# Patient Record
Sex: MALE | Race: WHITE | NOT HISPANIC OR LATINO | Employment: OTHER | URBAN - METROPOLITAN AREA
[De-identification: names, ages, dates, MRNs, and addresses within clinical notes are randomized per-mention and may not be internally consistent; named-entity substitution may affect disease eponyms.]

---

## 2020-06-23 ENCOUNTER — HISTORICAL (OUTPATIENT)
Dept: ADMINISTRATIVE | Facility: HOSPITAL | Age: 75
End: 2020-06-23

## 2020-06-29 LAB
FINAL CULTURE: NORMAL
FINAL CULTURE: NORMAL

## 2021-01-15 ENCOUNTER — HISTORICAL (OUTPATIENT)
Dept: CARDIOLOGY | Facility: HOSPITAL | Age: 76
End: 2021-01-15

## 2021-01-15 LAB
INR PPP: 1.1 (ref 0–1.3)
PROTHROMBIN TIME: 14.3 SECOND(S) (ref 11.1–13.7)

## 2021-08-13 ENCOUNTER — HOSPITAL ENCOUNTER (OUTPATIENT)
Dept: EMERGENCY MEDICINE | Facility: HOSPITAL | Age: 76
End: 2021-08-14
Attending: INTERNAL MEDICINE | Admitting: INTERNAL MEDICINE

## 2021-08-13 LAB
ABS NEUT (OLG): 5.75 X10(3)/MCL (ref 2.1–9.2)
ALBUMIN SERPL-MCNC: 4 GM/DL (ref 3.4–4.8)
ALBUMIN/GLOB SERPL: 1.5 RATIO (ref 1.1–2)
ALP SERPL-CCNC: 56 UNIT/L (ref 40–150)
ALT SERPL-CCNC: 23 UNIT/L (ref 0–55)
AST SERPL-CCNC: 20 UNIT/L (ref 5–34)
BASOPHILS # BLD AUTO: 0 X10(3)/MCL (ref 0–0.2)
BASOPHILS NFR BLD AUTO: 0 %
BILIRUB SERPL-MCNC: 0.7 MG/DL
BILIRUBIN DIRECT+TOT PNL SERPL-MCNC: 0.3 MG/DL (ref 0–0.5)
BILIRUBIN DIRECT+TOT PNL SERPL-MCNC: 0.4 MG/DL (ref 0–0.8)
BNP BLD-MCNC: 77.6 PG/ML
BUN SERPL-MCNC: 22.6 MG/DL (ref 8.4–25.7)
CALCIUM SERPL-MCNC: 9.6 MG/DL (ref 8.8–10)
CHLORIDE SERPL-SCNC: 103 MMOL/L (ref 98–107)
CO2 SERPL-SCNC: 25 MMOL/L (ref 23–31)
CREAT SERPL-MCNC: 1.01 MG/DL (ref 0.73–1.18)
EOSINOPHIL # BLD AUTO: 0.1 X10(3)/MCL (ref 0–0.9)
EOSINOPHIL NFR BLD AUTO: 1 %
ERYTHROCYTE [DISTWIDTH] IN BLOOD BY AUTOMATED COUNT: 17.4 % (ref 11.5–17)
GLOBULIN SER-MCNC: 2.7 GM/DL (ref 2.4–3.5)
GLUCOSE SERPL-MCNC: 114 MG/DL (ref 82–115)
HCT VFR BLD AUTO: 36.8 % (ref 42–52)
HGB BLD-MCNC: 11.4 GM/DL (ref 14–18)
LYMPHOCYTES # BLD AUTO: 3 X10(3)/MCL (ref 0.6–4.6)
LYMPHOCYTES NFR BLD AUTO: 30 %
MCH RBC QN AUTO: 30.8 PG (ref 27–31)
MCHC RBC AUTO-ENTMCNC: 31 GM/DL (ref 33–36)
MCV RBC AUTO: 99.5 FL (ref 80–94)
MONOCYTES # BLD AUTO: 1 X10(3)/MCL (ref 0.1–1.3)
MONOCYTES NFR BLD AUTO: 10 %
NEUTROPHILS # BLD AUTO: 5.75 X10(3)/MCL (ref 2.1–9.2)
NEUTROPHILS NFR BLD AUTO: 58 %
PLATELET # BLD AUTO: 294 X10(3)/MCL (ref 130–400)
PMV BLD AUTO: 9.9 FL (ref 9.4–12.4)
POTASSIUM SERPL-SCNC: 4.7 MMOL/L (ref 3.5–5.1)
PROT SERPL-MCNC: 6.7 GM/DL (ref 5.8–7.6)
RBC # BLD AUTO: 3.7 X10(6)/MCL (ref 4.7–6.1)
SARS-COV-2 AG RESP QL IA.RAPID: NEGATIVE
SARS-COV-2 RNA RESP QL NAA+PROBE: NOT DETECTED
SODIUM SERPL-SCNC: 137 MMOL/L (ref 136–145)
TROPONIN I SERPL-MCNC: <0.01 NG/ML (ref 0–0.04)
WBC # SPEC AUTO: 10 X10(3)/MCL (ref 4.5–11.5)

## 2022-04-30 NOTE — ED PROVIDER NOTES
Patient:   Cristofer Kwon             MRN: [id number]            FIN: [id number]               Age:   76 years     Sex:  Male     :  1945   Associated Diagnoses:   Chest pain in adult; Dyspnea; Hypoxia; HTN (hypertension)   Author:   Gigi Leone MD      Basic Information   Time seen: Date & time 2021 10:44:00.   History source: Patient, nursing home records, [facility].   Arrival mode: Wheelchair.   History limitation: None.   Additional information: Chief Complaint from Nursing Triage Note : Chief Complaint   2021 10:42 CDT      Chief Complaint           presents from [facility] c/o SOB and chest pain since this AM. NH reports low O2 saturation.HX of AFIB  (Modified) .      History of Present Illness   The patient presents with weakness, [facility]Or[facility] [place] PA: Male sent from nursing [place] for chest pain, dyspnea and weakness for ER evaluation.  and     I, Dr. Leone, assumed care of this patient at 1212.     76 year old white male presents to the ED from [facility] for SOB, nonradiating substernal chest pressure, patient reports chest pain occurred while he was at rest.  Associated with nausea and some clamminess.   Pt says the chest pressure this morning lasted 30 minutes to 1 hour, and he denies fever, chills, cough, and recent ill contacts. Pt currently has no chest pain but says he is still SOB..  The onset was 4  hours ago.  The course/duration of symptoms is constant.  The character of symptoms is generalized.  The degree at present is moderate.  Risk factors consist of none.  Prior episodes: occasional.  Therapy today: 2L O2 nasal cannula.  Associated symptoms: chest pain, abdominal pain, nausea and shortness of breath.  Additional history: none.        Review of Systems   Constitutional symptoms:  No fever, no chills   Skin symptoms:  No rash, no pruritus, no abrasions, no breakdown, no lesion.    Eye symptoms:  No pain, no blurred vision.     ENMT symptoms:  No ear pain, no sore throat, no nasal congestion.    Respiratory symptoms:  Shortness of breath, no cough, no sputum production   Cardiovascular symptoms:  Chest pain, inferior, central, pressure, no palpitations, no diaphoresis   Gastrointestinal symptoms:  Nausea, no abdominal pain, no vomiting, no diarrhea, no constipation.    Genitourinary symptoms:  No dysuria, no hematuria, no discharge.    Musculoskeletal symptoms:  No Muscle pain, no Joint pain   Neurologic symptoms:  No headache, no dizziness, no altered level of consciousness, no numbness, no tingling, no weakness.              Additional review of systems information: All other systems reviewed and otherwise negative.      Health Status   Allergies:    Allergic Reactions (Selected)  No Known Allergies.   Medications:  (Selected)   Prescriptions  Prescribed  Carafate 1 g oral tablet: 1 gm = 1 tab(s), Oral, AC & HS, # 120 tab(s), 0 Refill(s)  Protonix 40 mg ORAL enteric coated tablet: 40 mg = 1 tab(s), Oral, BID, # 60 tab(s), 0 Refill(s)  Xarelto 20mg Tablet: 10 mg = 0.5 tab(s), Oral, Daily, # 15 tab(s), 0 Refill(s)  calcium carbonate 500 mg oral tablet, chewable: 500 mg = 1 tab(s), Oral, BID, # 60 tab(s), 0 Refill(s)  verapamil 240 mg oral tablet, extended release: 240 mg = 1 tab(s), Oral, Daily, # 90 tab(s), 6 Refill(s)  Documented Medications  Documented  Azo-Standard 95 mg oral tablet: 95 mg = 1 tab(s), Oral, TID  Myrbetriq 25 mg oral tablet, extended release: 25 mg = 1 tab(s), Oral, Daily  Systane ophthalmic solution: 1 drop(s), Eye-Both, QID, PRN PRN as needed for dry eyes, # 12 mL, 0 Refill(s)  Vitamin B Complex oral capsule: 1 cap(s), Oral, Daily  allopurinol 100 mg oral tablet: 100 mg = 1 tab(s), Oral, Daily, # 30 tab(s), 0 Refill(s)  calcium citrate 315 mg oral tablet: 1 tab(s), Oral, BID, 0 Refill(s)  gabapentin 600 mg oral tablet: 600 mg = 1 tab(s), Oral, TID, # 90 tab(s), 0 Refill(s)  isosorbide MONOnitrate 60 mg oral tablet,  Extended Release: 60 mg = 1 tab(s), Oral, Daily  lisinopril 10 mg oral tablet: 10 mg = 1 tab(s), Oral, Daily, # 30 tab(s), 0 Refill(s)  pantoprazole 20 mg ORAL EC-Tablet: 20 mg = 1 tab(s), Oral, Daily, # 30 tab(s), 0 Refill(s)  potassium chloride 20 mEq oral TABLET extended release: 20 mEq = 1 tab(s), Oral, Daily, # 30 tab(s), 0 Refill(s)  pravastatin 40 mg oral tablet: 40 mg = 1 tab(s), Oral, Daily  sotalol 80 mg oral tablet: 80 mg = 1 tab(s), Oral, BID  tamsulosin 0.4 mg oral capsule: 0.4 mg = 1 cap(s), Oral, Daily, # 30 cap(s), 0 Refill(s)  traZODONE 50 mg oral tablet ( Desyrel ): 50 mg = 1 tab(s), Oral, Once a day (at bedtime), # 30 tab(s), 0 Refill(s)  venlafaxine 75 mg oral capsule, extended release: 75 mg = 1 cap(s), Oral, Daily, # 30 cap(s), 0 Refill(s).      Past Medical/ Family/ Social History   Medical history:    Active  HTN - Hypertension ([id number])  Anemia ([id number])  PAF - Paroxysmal atrial fibrillation ([id number])  Urinary retention ([id number]).   Surgical history:    Esophagogastroduodenoscopy on 6/22/2021 at 76 Years.  Comments:  6/22/2021 14:02 CDT - Arelis ESPINAL, Dimas Deutsch  auto-populated from documented surgical case  Cystostomy Suprapubic (None) on 7/9/2020 at 75 Years.  Comments:  7/9/2020 19:14 ROBERT - Luis Felipe Graf RN  auto-populated from documented surgical case.   Family history:    No family history items have been selected or recorded..   Social history:    Social & Psychosocial Habits    Tobacco  09/08/2020  Use: Former smoker, quit more    Patient Wants Consult For Cessation Counseling No    10/06/2020  Use: Former smoker, quit more    Type: Oral    Patient Wants Consult For Cessation Counseling N/A    Smokeless tobacco use: Smokeless tobacco user wi    01/15/2021  Use: 10 or more cigarettes (1/    Type: Oral    Patient Wants Consult For Cessation Counseling No    Smokeless tobacco use: Smokeless tobacco user wi    03/15/2021  Use: Never (less than 100 in l    Patient Wants  Consult For Cessation Counseling No    06/15/2021  Use: Former smoker, quit more    Patient Wants Consult For Cessation Counseling N/A    06/16/2021  Use: Former smoker, quit more    Patient Wants Consult For Cessation Counseling No    Abuse/Neglect  09/08/2020  SHX Any signs of abuse or neglect No    10/06/2020  SHX Any signs of abuse or neglect No    Feels unsafe at home: No    Safe place to go: Yes    01/15/2021  SHX Any signs of abuse or neglect No    03/15/2021  SHX Any signs of abuse or neglect No    Feels unsafe at home: No    Safe place to go: Yes    06/15/2021  SHX Any signs of abuse or neglect No    06/16/2021  SHX Any signs of abuse or neglect No  , Alcohol use: Denies, Tobacco use: dipping, Drug use: Denies, Family/social situation: Nursing home resident ([facility]).      Physical Examination               Vital Signs   Vital Signs   8/13/2021 10:42 CDT      Temperature Oral          37.1 DegC                             Temperature Oral (calculated)             98.78 DegF                             Peripheral Pulse Rate     75 bpm                             Respiratory Rate          20 br/min                             SpO2                      93 %  LOW                             Oxygen Therapy            Nasal cannula                             Oxygen Flow Rate          2 L/min                             Systolic Blood Pressure   116 mmHg                             Diastolic Blood Pressure  63 mmHg  .      Vital Signs (last 24 hrs)_____  Last Charted___________  Temp Oral     37.1 DegC  (AUG 13 10:42)  Heart Rate Peripheral   65 bpm  (AUG 13 12:00)  Resp Rate         16 br/min  (AUG 13 12:00)  SBP      128 mmHg  (AUG 13 12:00)  DBP      78 mmHg  (AUG 13 12:00)  SpO2      94 %  (AUG 13 12:00)  .   Basic Oxygen Information   8/13/2021 12:00 CDT      SpO2                      94 %                             Oxygen Flow Rate          3 L/min                             Oxygen  Therapy            Nasal cannula    8/13/2021 10:42 CDT      SpO2                      93 %  LOW                             Oxygen Flow Rate          2 L/min                             Oxygen Therapy            Nasal cannula  .   General:  Alert, no acute distress, Pleasant 76 year old male.    Skin:  Warm, dry, pink.    Head:  Normocephalic, atraumatic.    Neck:  Supple, trachea midline.    Eye:  Pupils are equal, round and reactive to light, extraocular movements are intact, normal conjunctiva.    Ears, nose, mouth and throat:  Tympanic membranes clear, oral mucosa moist.    Cardiovascular:  Regular rate and rhythm, No murmur, No edema, Capillary refill: Bilateral, upper extremity, lower extremity, < 2 seconds.    Respiratory:  Lungs are clear to auscultation, respirations are non-labored, breath sounds are equal, Does not appear to be in respiratory distress, Patient is speaking in full sentences.    Chest wall:  No tenderness.   Back:  Nontender, Normal range of motion, Normal alignment.    Musculoskeletal:  Normal ROM, normal strength, no tenderness, no swelling, no deformity.    Gastrointestinal:  Soft, Nontender, Non distended, Normal bowel sounds.    Neurological:  Alert and oriented to person, place, time, and situation, No focal neurological deficit observed, CN II-XII intact, normal speech observed (speaking in complete sentences)   Psychiatric:  Cooperative, appropriate mood & affect      Medical Decision Making   Differential Diagnosis:  Weakness, dizziness, hypotension, hypoglycemia, pneumonia, Angina, cardiovascular disease.    Rationale:  Patient's initial labs including troponin are unremarkable.  EKG within normal limits.  Chest x-ray unremarkable.  His vitals are stable.  He does intermittently require 2 L nasal cannula to maintain oxygen saturation greater than 92%.  Off of oxygen he does desaturate to approximately 89%.  He does voice improvement of his shortness of breath while in the  emergency department.  Denies any further episodes of chest pain.  Patient will be admitted to physician covering for his PCP Dr Guillen, asked that CTA pulmonary be acquired and patient be given Lovenox.  [facility] is consult and will follow up on patient on floor for serial troponins, further evaluation..   Documents reviewed:  Emergency department nurses' notes.   Orders  Launch Orders   Laboratory:  BNP (Order): Stat collect, 8/13/2021 10:45 CDT, Blood, Lab Collect, Print Label By Order Location, 8/13/2021 10:45 CDT  COVID-19  IDnow (Order): Stat collect, Nasal, 8/13/2021 10:45 CDT, Nurse collect  Troponin-I (Order): Stat collect, 8/13/2021 10:45 CDT, Blood, Lab Collect, Print Label By Order Location, 8/13/2021 10:45 CDT  CMP (Order): Stat collect, 8/13/2021 10:45 CDT, Blood, Lab Collect, Print Label By Order Location, 8/13/2021 10:45 CDT  CBC w/ Auto Diff (Order): Now collect, 8/13/2021 10:45 CDT, Blood, Lab Collect, Print Label By Order Location, 8/13/2021 10:45 CDT  Radiology:  XR Chest 2 Views (Order): Stat, 8/13/2021 10:45 CDT, Dyspnea, None, Stretcher, Rad Type, Not Scheduled  Cardiology:  EKG Adult 12 Lead (Order): 8/13/2021 10:45 CDT, Stat, Once, 8/13/2021 10:45 CDT, -1, -1, 8/13/2021 10:45 CDT.   Electrocardiogram:  Time 8/13/2021 10:42:00, rate 72, normal sinus rhythm, No ST-T changes, no ectopy, normal GA & QRS intervals, EP Interp, QT interval 477.    Results review:  Lab results : Lab View   8/13/2021 10:58 CDT      Sodium Lvl                137 mmol/L                             Potassium Lvl             4.7 mmol/L                             Chloride                  103 mmol/L                             CO2                       25 mmol/L                             Calcium Lvl               9.6 mg/dL                             Glucose Lvl               114 mg/dL                             BUN                       22.6 mg/dL                             Creatinine                1.01 mg/dL                              eGFR-AA                   >60  NA                             eGFR-RUBINA                  >60 mL/min/1.73 m2  NA                             Bili Total                0.7 mg/dL                             Bili Direct               0.3 mg/dL                             Bili Indirect             0.40 mg/dL                             AST                       20 unit/L                             ALT                       23 unit/L                             Alk Phos                  56 unit/L                             Total Protein             6.7 gm/dL                             Albumin Lvl               4.0 gm/dL                             Globulin                  2.7 gm/dL                             A/G Ratio                 1.5 ratio                             BNP                       77.6 pg/mL                             Troponin-I                <0.010 ng/mL                             WBC                       10.0 x10(3)/mcL                             RBC                       3.70 x10(6)/mcL  LOW                             Hgb                       11.4 gm/dL  LOW                             Hct                       36.8 %  LOW                             Platelet                  294 x10(3)/mcL                             MCV                       99.5 fL  HI                             MCH                       30.8 pg                             MCHC                      31.0 gm/dL  LOW                             RDW                       17.4 %  HI                             MPV                       9.9 fL                             Abs Neut                  5.75 x10(3)/mcL                             Neutro Auto               58 %  NA                             Lymph Auto                30 %  NA                             Mono Auto                 10 %  NA                             Eos Auto                  1 %  NA                             Abs Eos                    0.1 x10(3)/mcL                             Basophil Auto             0 %  NA                             Abs Neutro                5.75 x10(3)/mcL                             Abs Lymph                 3.0 x10(3)/mcL                             Abs Mono                  1.0 x10(3)/mcL                             Abs Baso                  0.0 x10(3)/mcL    8/13/2021 10:46 CDT      COVID-19 Rapid            NEGATIVE  .   Radiology results:  Rad Results (ST)  < 12 hrs   Accession: [id number]  Order: XR Chest 2 Views  Report Dt/Tm: 08/13/2021 11:41  Report:      History:  Dyspnea     Reference:  30 July 2021     Findings:  PA and lateral views of the chest were obtained. Heart is not  enlarged. Bilateral opacities are improved compared to the prior film  with some persistent opacities at the left lung base. No pneumothorax.     Impression:   Bilateral opacities improved with some persistent left basilar  opacities.               .      Reexamination/ Reevaluation   Time: 8/13/2021 13:30:00 .   Course: improving.   Assessment: exam unchanged.   Notes: Patient reports his shortness of breath has improved but has not resolved.  Denies chest pain at this time states that he would like to go home.  He states a nursing home he does occasionally require oxygen for low pulse ox but not chronically.  He is amenable to staying for a delta troponin..      Impression and Plan   Diagnosis   Chest pain in adult (XUJ70-HH R07.9)   Dyspnea (GDI91-WP R06.00)   Hypoxia (YPB52-ZH R09.02)   HTN (hypertension) (HKP55-SJ I10)      Calls-Consults   -  8/13/2021 15:14:00 , [facility], recommends No Recommendations, will see patient after admission.    Plan   Condition: Improved.    Disposition: Admit time  8/13/2021 15:12:00, Place in Observation Unit, Cortes Guillen MD.    Counseled: Patient, Regarding diagnosis, Regarding diagnostic results, Regarding treatment plan, Patient indicated understanding of instructions.    Notes: Vanessa RAY  acted solely as a scribe for and in the presence of Dr. Leone who performed the service. .       Addendum   I, Gigi Leone MD, personally performed the services described in this documentation as described in my presence. I performed the history, physical exam, and had face-to-face time with the patient.  This note is accurate and complete.

## 2022-04-30 NOTE — H&P
Patient:   Cristofer Kwon             MRN: [id number]            FIN: [id number]               Age:   76 years     Sex:  Male     :  1945   Associated Diagnoses:   Weakness or fatigue; HTN (hypertension); Dyspnea; Chest pain in adult; Hypoxia   Author:   Cortes Guillen MD      Basic Information   Admit information:  Fluctuating blood pressure. .    Source of history:  Medical record.    Present at bedside:  Medical personnel.    History limitation:  None.       Chief Complaint   2021 10:42 CDT      presents from [facility] c/o SOB and chest pain since this AM. NH reports low O2 saturation.HX of AFIB  (Modified)       History of Present Illness   76 year old white male presents to the ED from [facility] for SOB, nonradiating substernal chest pressure, patient reports chest pain occurred while he was at rest.  Associated with nausea and some clamminess.   Pt says the chest pressure this morning lasted 30 minutes to 1 hour, and he denies fever, chills, cough, and recent ill contacts. Pt currently has no chest pain but says he is still SOB..  The onset was 4  hours ago.  The course/duration of symptoms is constant.  The character of symptoms is generalized.  The degree at present is moderate.  Risk factors consist of none.  Prior episodes: occasional.  Therapy today: 2L O2 nasal cannula.  Associated symptoms: chest pain, abdominal pain, nausea and shortness of breath.  Additional history: none.         Review of Systems   Constitutional:  Negative except as documented in history of present illness, No fever, No chills, No sweats.    Eye:  No icterus, No discharge, No double vision.    Ear/Nose/Mouth/Throat:  No decreased hearing, No ear pain.    Respiratory:  Negative, No sputum production, No hemoptysis.    Cardiovascular:  Negative.    Gastrointestinal:  Negative.    Genitourinary:  Negative.    Hematology/Lymphatics:  Negative.    Endocrine:  Negative.    Immunologic:   Recorded as Task  Date: 01/10/2018 09:17 AM, Created By: Molly De Jesus  Task Name: 4. Patient Message  Assigned To: [facility] Neuro Nurse Team  Regarding Patient: MARE JEROME, Status: In Progress  Comment:   Molly De Jesus - 10 Cresencio 2018 9:17 AM    Patient Message to Provider  \"REASON FOR CALL: Patients dad is calling to speak with the office about what are some of the sideeffects of the Depakote medication? She also needs a note for school for her to have OT SERVICES at school.    CALLER'S RELATIONSHIP TO PATIENT: dad  IF OTHER, NAME AND RELATIONSHIP: Francisco    BEST NUMBER TO BE CONTACTED: [phone]-dad cell  ALTERNATIVE PHONE NUMBER: [phone]-wife edgar     Turnaround time given to caller:  \"\"THIS MESSAGE WILL BE SENT TO  ___ (state provider's first and last name) ____, THE CLINICAL TEAM WILL RETURN YOUR CALL AS SOON AS THEY HAVE REVIEWED YOUR MESSAGE\"\"    READ BACK MESSAGE TO PATIENT\"  Sherrell Bar - 11 Jan 2018 2:17 PM    UNDELEGATED TASK  Sherrell Bar - 11 Jan 2018 2:17 PM    TASK IN PROGRESS  Sherrell Bar - 11 Jan 2018 2:17 PM    TASK EDITED  do want ot for her.  KATERINA KEITH - 11 Jan 2018 3:35 PM    TASK EDITED  letter for OT written in EMR    common side effects of Depakote are weight gain, nausea, somnolence, dizziness, tremor, alopecia, rash, blurred vision. Rare side effects are hepatitis, pancreatitis, hyponatremia, pancytopenia, elevated ammonia, aplastic anemia, coma  Sabiha Lopez - 12 Jan 2018 3:04 PM    TASK EDITED  LM for patient's father to return call.  Ngoc Cerda - 12 Jan 2018 3:10 PM    TASK REASSIGNED: Previously Assigned To [facility]  Patient father is returning the nurse call please call him back [phone]  Sabiha Lopez - 12 Jan 2018 3:21 PM    TASK EDITED  Spoke with patient's father - father informed that letter was completed. Mailed to home address on file.     Per father's request he wanted the side effects mailed to home address as well.      Electronically signed by:Sabiha   John CONROY  Jan 12 2018  3:21PM CST     Negative.    Musculoskeletal:  Negative.    Integumentary:  Negative.    Neurologic:  Negative.    Psychiatric:  Negative.    All other systems are negative      Health Status   Allergies:    Allergic Reactions (Selected)  No Known Allergies,    Allergies (1) Active Reaction  No Known Allergies None Documented     Current medications:  (Selected)   Inpatient Medications  Ordered  Lovenox: 90 mg, form: Injection, Subcutaneous, BID, first dose 08/13/21 21:00:00 CDT  Protonix 40 mg ORAL enteric coated tablet: 40 mg, form: Tab-EC, Oral, BID, first dose 08/13/21 16:01:00 CDT, STAT  Zofran 2 mg/mL injectable solution: 4 mg, form: Injection, IV Push, q4hr PRN for nausea, first dose 08/14/21 8:23:00 CDT  isosorbide MONOnitrate 60 mg oral tablet, Extended Release: 60 mg, form: Tab-ER, Oral, Daily, first dose 08/13/21 16:00:00 CDT, STAT  lisinopril 10 mg oral tablet: 10 mg, form: Tab, Oral, Daily, first dose 08/13/21 16:00:00 CDT, STAT  simvastatin 20 mg oral tablet: 20 mg, form: Tab, Oral, Daily, first dose 08/13/21 16:01:00 CDT, STAT  sotalol 80 mg oral tablet: 80 mg, form: Tab, Oral, BID, first dose 08/13/21 16:01:00 CDT, STAT  verapamil extended release: 240 mg, form: SR Tab, Oral, Daily, first dose 08/13/21 16:01:00 CDT, STAT  Prescriptions  Prescribed  Carafate 1 g oral tablet: 1 gm = 1 tab(s), Oral, AC & HS, # 120 tab(s), 0 Refill(s)  Protonix 40 mg ORAL enteric coated tablet: 40 mg = 1 tab(s), Oral, BID, # 60 tab(s), 0 Refill(s)  Xarelto 20mg Tablet: 10 mg = 0.5 tab(s), Oral, Daily, # 15 tab(s), 0 Refill(s)  calcium carbonate 500 mg oral tablet, chewable: 500 mg = 1 tab(s), Oral, BID, # 60 tab(s), 0 Refill(s)  verapamil 240 mg oral tablet, extended release: 240 mg = 1 tab(s), Oral, Daily, # 90 tab(s), 6 Refill(s)  Documented Medications  Documented  Azo-Standard 95 mg oral tablet: 95 mg = 1 tab(s), Oral, TID  Myrbetriq 25 mg oral tablet, extended release: 25 mg = 1 tab(s), Oral, Daily  Systane Complete Optimal Dry Eye  Relief: 0 Refill(s)  Systane ophthalmic solution: 1 drop(s), Eye-Both, QID, PRN PRN as needed for dry eyes, # 12 mL, 0 Refill(s)  Template Non-Formulary Med: 30 mL, Oral, BID, Proteinex, 0 Refill(s)  Vitamin B Complex oral capsule: 1 cap(s), Oral, Daily  allopurinol 100 mg oral tablet: 100 mg = 1 tab(s), Oral, Daily, # 30 tab(s), 0 Refill(s)  calcium citrate 315 mg oral tablet: 1 tab(s), Oral, BID, 0 Refill(s)  gabapentin 600 mg oral tablet: 600 mg = 1 tab(s), Oral, TID, # 90 tab(s), 0 Refill(s)  isosorbide MONOnitrate 60 mg oral tablet, Extended Release: 60 mg = 1 tab(s), Oral, Daily  lisinopril 10 mg oral tablet: 10 mg = 1 tab(s), Oral, Daily, # 30 tab(s), 0 Refill(s)  pantoprazole 20 mg ORAL EC-Tablet: 20 mg = 1 tab(s), Oral, Daily, # 30 tab(s), 0 Refill(s)  potassium chloride 20 mEq oral TABLET extended release: 20 mEq = 1 tab(s), Oral, Daily, # 30 tab(s), 0 Refill(s)  pravastatin 40 mg oral tablet: 40 mg = 1 tab(s), Oral, Daily  sotalol 80 mg oral tablet: 80 mg = 1 tab(s), Oral, BID  tamsulosin 0.4 mg oral capsule: 0.4 mg = 1 cap(s), Oral, Daily, # 30 cap(s), 0 Refill(s)  traZODONE 50 mg oral tablet ( Desyrel ): 50 mg = 1 tab(s), Oral, Once a day (at bedtime), # 30 tab(s), 0 Refill(s)  venlafaxine 75 mg oral capsule, extended release: 75 mg = 1 cap(s), Oral, Daily, # 30 cap(s), 0 Refill(s),    Medications (8) Active  Scheduled: (7)  enoxaparin 100mg/ml Inj  90 mg 0.9 mL, Subcutaneous, BID  isosorbide mononitrate 60 mg CR  60 mg 1 tab(s), Oral, Daily  lisinopril 10 mg Tab UD  10 mg 1 tab(s), Oral, Daily  pantoprazole 40 mg EC Tab  40 mg 1 tab(s), Oral, BID  simvastatin 20 mg Tab UD  20 mg 1 tab(s), Oral, Daily  sotalol 80 mg Tab UD  80 mg 1 tab(s), Oral, BID  verapamil 240 mg ER Tab UD (Isoptin SR)  240 mg 1 tab(s), Oral, Daily  Continuous: (0)  PRN: (1)  ondansetron 2 mg/mL inj - 2mL  4 mg 2 mL, IV Push, q4hr     Problem list:    All Problems  Urinary retention / SNOMED CT 326387423 / Confirmed  PAF -  Paroxysmal atrial fibrillation / SNOMED CT 348140994 / Confirmed  Anemia / SNOMED CT 382318660 / Confirmed  HTN - Hypertension / SNOMED CT 0411119821 / Confirmed  Morbid obesity / SNOMED CT 058726625 / Probable  Tobacco user / SNOMED CT 681390990 / Confirmed  Heart murmur / SNOMED CT 629646849 / Confirmed  Urinary retention / SNOMED CT 791161480 / Confirmed,    Active Problems (8)  Anemia   Heart murmur   HTN - Hypertension   Morbid obesity   PAF - Paroxysmal atrial fibrillation   Tobacco user   Urinary retention   Urinary retention         Histories   Past Medical History:    Active  HTN - Hypertension (1579709319)  Anemia (182814773)  PAF - Paroxysmal atrial fibrillation (512034309)  Urinary retention (638493600)   Family History:    No family history items have been selected or recorded.   Procedure history:    Esophagogastroduodenoscopy on 6/22/2021 at 76 Years.  Comments:  6/22/2021 14:02 CDT Diana Infante RN, Dimas Deutsch  auto-populated from documented surgical case  Cystostomy Suprapubic (None) on 7/9/2020 at 75 Years.  Comments:  7/9/2020 19:14 ROBERT Graf RN, Luis Felipe Hager  auto-populated from documented surgical case   Social History        Social & Psychosocial Habits    Tobacco  09/08/2020  Use: Former smoker, quit more    Patient Wants Consult For Cessation Counseling No    10/06/2020  Use: Former smoker, quit more    Type: Oral    Patient Wants Consult For Cessation Counseling N/A    Smokeless tobacco use: Smokeless tobacco user wi    01/15/2021  Use: 10 or more cigarettes (1/    Type: Oral    Patient Wants Consult For Cessation Counseling No    Smokeless tobacco use: Smokeless tobacco user wi    03/15/2021  Use: Never (less than 100 in l    Patient Wants Consult For Cessation Counseling No    06/15/2021  Use: Former smoker, quit more    Patient Wants Consult For Cessation Counseling N/A    06/16/2021  Use: Former smoker, quit more    Patient Wants Consult For Cessation Counseling No    08/14/2021   Use: Former smoker, quit more    Patient Wants Consult For Cessation Counseling N/A    Abuse/Neglect  09/08/2020  SHX Any signs of abuse or neglect No    10/06/2020  SHX Any signs of abuse or neglect No    Feels unsafe at home: No    Safe place to go: Yes    01/15/2021  SHX Any signs of abuse or neglect No    03/15/2021  SHX Any signs of abuse or neglect No    Feels unsafe at home: No    Safe place to go: Yes    06/15/2021  SHX Any signs of abuse or neglect No    06/16/2021  SHX Any signs of abuse or neglect No    08/14/2021  SHX Any signs of abuse or neglect No    Feels unsafe at home: No    Safe place to go: Yes  .        Physical Examination   General:  No acute distress.    Eye:  Vision unchanged.    HENT:  Normocephalic.    Neck:  Supple.    Respiratory:  Lungs are clear to auscultation.    Cardiovascular:  Normal rate.    Gastrointestinal:  Soft, Non-tender.    Vital Signs   8/14/2021 10:00 CDT      Peripheral Pulse Rate     89 bpm                             Heart Rate Monitored      88 bpm                             Respiratory Rate          18 br/min                             SpO2                      95 %                             Oxygen Therapy            Nasal cannula                             Oxygen Flow Rate          2 L/min                             Systolic Blood Pressure   130 mmHg                             Diastolic Blood Pressure  79 mmHg                             Mean Arterial Pressure, Cuff              96 mmHg    8/14/2021 7:00 CDT       Temperature Oral          36.7 DegC                             Temperature Oral (calculated)             98.06 DegF                             Peripheral Pulse Rate     69 bpm                             Heart Rate Monitored      69 bpm                             Respiratory Rate          16 br/min                             SpO2                      94 %                             Oxygen Therapy            Nasal cannula                              Oxygen Flow Rate          2 L/min                             Systolic Blood Pressure   166 mmHg  HI                             Diastolic Blood Pressure  86 mmHg                             Mean Arterial Pressure, Cuff              113 mmHg    8/14/2021 6:00 CDT       Respiratory Rate          14 br/min                             SpO2                      93 %  LOW    8/14/2021 5:00 CDT       Peripheral Pulse Rate     56 bpm  LOW                             Heart Rate Monitored      56 bpm  LOW                             Respiratory Rate          16 br/min                             SpO2                      94 %                             Oxygen Therapy            Nasal cannula                             Oxygen Flow Rate          2 L/min                             Systolic Blood Pressure   145 mmHg  HI                             Diastolic Blood Pressure  71 mmHg                             Mean Arterial Pressure, Cuff              96 mmHg    8/14/2021 4:00 CDT       Peripheral Pulse Rate     57 bpm  LOW                             Heart Rate Monitored      57 bpm  LOW                             Respiratory Rate          20 br/min                             SpO2                      93 %  LOW                             Oxygen Therapy            Nasal cannula                             Oxygen Flow Rate          2 L/min                             Systolic Blood Pressure   148 mmHg  HI                             Diastolic Blood Pressure  91 mmHg  HI                             Mean Arterial Pressure, Cuff              110 mmHg    8/14/2021 1:30 CDT       Temperature Oral          36.6 DegC                             Temperature Oral (calculated)             97.88 DegF                             Peripheral Pulse Rate     65 bpm                             Respiratory Rate          15 br/min                             SpO2                      92 %  LOW                             Oxygen Therapy             Room air                             Systolic Blood Pressure   142 mmHg  HI                             Diastolic Blood Pressure  76 mmHg                             Mean Arterial Pressure, Cuff              98 mmHg    8/13/2021 20:00 CDT      Peripheral Pulse Rate     78 bpm                             Heart Rate Monitored      78 bpm                             Respiratory Rate          16 br/min                             SpO2                      92 %  LOW                             Oxygen Therapy            Room air                             Systolic Blood Pressure   133 mmHg                             Diastolic Blood Pressure  75 mmHg                             Mean Arterial Pressure, Cuff              94 mmHg    8/13/2021 19:00 CDT      Peripheral Pulse Rate     77 bpm                             Heart Rate Monitored      78 bpm                             Respiratory Rate          10 br/min  LOW                             SpO2                      94 %                             Oxygen Therapy            Nasal cannula                             Oxygen Flow Rate          3 L/min                             Systolic Blood Pressure   125 mmHg                             Diastolic Blood Pressure  71 mmHg                             Mean Arterial Pressure, Cuff              89 mmHg    8/13/2021 18:00 CDT      Peripheral Pulse Rate     77 bpm                             Heart Rate Monitored      76 bpm                             Respiratory Rate          17 br/min                             SpO2                      92 %  LOW                             Oxygen Therapy            Nasal cannula                             Oxygen Flow Rate          3 L/min                             Systolic Blood Pressure   130 mmHg                             Diastolic Blood Pressure  61 mmHg                             Mean Arterial Pressure, Cuff              84 mmHg    8/13/2021 16:00 CDT      Peripheral Pulse  Rate     70 bpm                             SpO2                      95 %                             Oxygen Therapy            Nasal cannula                             Oxygen Flow Rate          3 L/min                             Systolic Blood Pressure   113 mmHg                             Diastolic Blood Pressure  59 mmHg  LOW                             Mean Arterial Pressure, Cuff              77 mmHg    8/13/2021 15:00 CDT      Temperature Oral          37.5 DegC                             Temperature Oral (calculated)             99.50 DegF                             Peripheral Pulse Rate     68 bpm                             SpO2                      94 %                             Oxygen Therapy            Nasal cannula                             Oxygen Flow Rate          3 L/min                             Systolic Blood Pressure   110 mmHg                             Diastolic Blood Pressure  61 mmHg                             Mean Arterial Pressure, Cuff              77 mmHg    8/13/2021 14:00 CDT      Peripheral Pulse Rate     64 bpm                             Heart Rate Monitored      65 bpm                             Respiratory Rate          13 br/min                             SpO2                      94 %                             Oxygen Therapy            Nasal cannula                             Oxygen Flow Rate          3 L/min                             Systolic Blood Pressure   136 mmHg                             Diastolic Blood Pressure  77 mmHg                             Mean Arterial Pressure, Cuff              97 mmHg    8/13/2021 13:00 CDT      Peripheral Pulse Rate     63 bpm                             Heart Rate Monitored      64 bpm                             Respiratory Rate          15 br/min                             SpO2                      91 %  LOW                             Oxygen Therapy            Room air                             Systolic Blood  Pressure   131 mmHg                             Diastolic Blood Pressure  72 mmHg                             Mean Arterial Pressure, Cuff              92 mmHg    8/13/2021 12:00 CDT      Peripheral Pulse Rate     65 bpm                             Heart Rate Monitored      65 bpm                             Respiratory Rate          16 br/min                             SpO2                      94 %                             Oxygen Therapy            Nasal cannula                             Oxygen Flow Rate          3 L/min                             Systolic Blood Pressure   128 mmHg                             Diastolic Blood Pressure  78 mmHg                             Mean Arterial Pressure, Cuff              95 mmHg    8/13/2021 10:42 CDT      Temperature Oral          37.1 DegC                             Temperature Oral (calculated)             98.78 DegF                             Peripheral Pulse Rate     75 bpm                             Respiratory Rate          20 br/min                             SpO2                      93 %  LOW                             Oxygen Therapy            Nasal cannula                             Oxygen Flow Rate          2 L/min                             Systolic Blood Pressure   116 mmHg                             Diastolic Blood Pressure  63 mmHg        Vital Signs (last 24 hrs)_____  Last Charted___________  Temp Oral     36.7 DegC  (AUG 14 07:00)  Heart Rate Peripheral   89 bpm  (AUG 14 10:00)  Resp Rate         18 br/min  (AUG 14 10:00)  SBP      130 mmHg  (AUG 14 10:00)  DBP      79 mmHg  (AUG 14 10:00)  SpO2      95 %  (AUG 14 10:00)     Genitourinary:  No costovertebral angle tenderness.    Lymphatics:  No lymphadenopathy neck, axilla, groin.    Musculoskeletal:  No tenderness.    Integumentary:  Warm.    Neurologic:  Alert.    Cognition and Speech:  Oriented.    Psychiatric:  Cooperative.       Review / Management   Laboratory Results   Last 5 Days  Lab Results : PowerNote Discrete Results   8/13/2021 20:51 CDT      Troponin-I                <0.010 ng/mL    8/13/2021 15:25 CDT      SARS-CoV-2 PCR            Not Detected    8/13/2021 14:21 CDT      Troponin-I                <0.010 ng/mL    8/13/2021 10:58 CDT      WBC                       10.0 x10(3)/mcL                             RBC                       3.70 x10(6)/mcL  LOW                             Hgb                       11.4 gm/dL  LOW                             Hct                       36.8 %  LOW                             Platelet                  294 x10(3)/mcL                             MCV                       99.5 fL  HI                             MCH                       30.8 pg                             MCHC                      31.0 gm/dL  LOW                             RDW                       17.4 %  HI                             MPV                       9.9 fL                             Abs Neut                  5.75 x10(3)/mcL                             Neutro Auto               58 %  NA                             Lymph Auto                30 %  NA                             Mono Auto                 10 %  NA                             Eos Auto                  1 %  NA                             Abs Eos                   0.1 x10(3)/mcL                             Basophil Auto             0 %  NA                             Abs Neutro                5.75 x10(3)/mcL                             Abs Lymph                 3.0 x10(3)/mcL                             Abs Mono                  1.0 x10(3)/mcL                             Abs Baso                  0.0 x10(3)/mcL                             Sodium Lvl                137 mmol/L                             Potassium Lvl             4.7 mmol/L                             Chloride                  103 mmol/L                             CO2                       25 mmol/L                             Calcium Lvl                9.6 mg/dL                             Glucose Lvl               114 mg/dL                             BUN                       22.6 mg/dL                             Creatinine                1.01 mg/dL                             eGFR-AA                   >60  NA                             eGFR-RUBINA                  >60 mL/min/1.73 m2  NA                             Bili Total                0.7 mg/dL                             Bili Direct               0.3 mg/dL                             Bili Indirect             0.40 mg/dL                             AST                       20 unit/L                             ALT                       23 unit/L                             Alk Phos                  56 unit/L                             Total Protein             6.7 gm/dL                             Albumin Lvl               4.0 gm/dL                             Globulin                  2.7 gm/dL                             A/G Ratio                 1.5 ratio                             BNP                       77.6 pg/mL                             Troponin-I                <0.010 ng/mL        Documentation reviewed:       Case discussed with: Referring physician  ER MD.    Condition:  Stable.       Impression and Plan   Diagnosis     Weakness or fatigue (PNED [id number]).     HTN (hypertension) (VDS41-WV I10).     Dyspnea (ELT17-WR R06.00).     Chest pain in adult (QFS95-WC R07.9).     Hypoxia (UCK36-QT R09.02).     Course:  Well controlled, CTP protocol was negative.  Troponins were completely negative.  Vital signs had remained stable., Cardiology has seen the patient doesn't have signed off.  Patient will return back to nursing home..    Diagnosis     Weakness or fatigue (PNED [id number]).     HTN (hypertension) (COW40-HB I10).     Dyspnea (QVS29-TH R06.00).     Chest pain in adult (EFJ92-SI R07.9).     Hypoxia (VZN22-NJ R09.02).     Course:   Well controlled.    Education and Follow-up:       Counseled: Patient, Regarding diagnosis, Regarding treatment, Regarding medications.